# Patient Record
Sex: FEMALE | URBAN - METROPOLITAN AREA
[De-identification: names, ages, dates, MRNs, and addresses within clinical notes are randomized per-mention and may not be internally consistent; named-entity substitution may affect disease eponyms.]

---

## 2023-03-02 ENCOUNTER — APPOINTMENT (RX ONLY)
Dept: URBAN - METROPOLITAN AREA CLINIC 45 | Facility: CLINIC | Age: 33
Setting detail: DERMATOLOGY
End: 2023-03-02

## 2023-03-02 DIAGNOSIS — Z41.9 ENCOUNTER FOR PROCEDURE FOR PURPOSES OTHER THAN REMEDYING HEALTH STATE, UNSPECIFIED: ICD-10-CM

## 2023-03-02 PROCEDURE — ? FULL BODY SKIN EXAM - DECLINED

## 2023-03-02 PROCEDURE — ? COSMETIC CONSULTATION: LHR

## 2023-03-02 PROCEDURE — ? ADDITIONAL NOTES

## 2023-03-02 NOTE — PROCEDURE: COSMETIC CONSULTATION: [facility]
Price (Use Numbers Only, No Special Characters Or $): 449 Price (Use Numbers Only, No Special Characters Or $): 960

## 2023-03-02 NOTE — HPI: COSMETIC CONSULTATION
Additional History: New patient \\nPatient present for laser hair removal, patient started laser at a different clinic but clinic closed

## 2023-03-14 ENCOUNTER — APPOINTMENT (RX ONLY)
Dept: URBAN - METROPOLITAN AREA CLINIC 45 | Facility: CLINIC | Age: 33
Setting detail: DERMATOLOGY
End: 2023-03-14

## 2023-03-14 DIAGNOSIS — Z41.9 ENCOUNTER FOR PROCEDURE FOR PURPOSES OTHER THAN REMEDYING HEALTH STATE, UNSPECIFIED: ICD-10-CM

## 2023-03-14 PROCEDURE — ? LASER HAIR REMOVAL

## 2023-03-14 ASSESSMENT — LOCATION ZONE DERM: LOCATION ZONE: TRUNK

## 2023-03-14 ASSESSMENT — LOCATION SIMPLE DESCRIPTION DERM: LOCATION SIMPLE: GROIN

## 2023-03-14 ASSESSMENT — LOCATION DETAILED DESCRIPTION DERM: LOCATION DETAILED: RIGHT SUPRAPUBIC SKIN

## 2023-03-14 NOTE — PROCEDURE: LASER HAIR REMOVAL
Detail Level: Detailed
Device Serial Number (Optional): Rental Unit
Laser Type: Nd:Yag [id number]
Consent: Written consent obtained, risks reviewed including but not limited to crusting, scabbing, blistering, scarring, darker or lighter pigmentary change, paradoxical hair regrowth, incomplete removal of hair and infection.
Pulse Duration (Include Units): 15MS
Number Of Prepaid Treatments (Will Not Render If 0): 0
Spot Size: 15 mm
Total Pulses: 331
Cooling: DCD setting
Eye Shield Text: Given the treatment area protective safety goggles were worn prior to starting treatment and during entire treatment.
External Cooling Fan Speed: 5
Post-Procedure Care: Immediate endpoint: perifollicular erythema and edema. Sunscreen applied. Post care reviewed with patient.
Price (Use Numbers Only, No Special Characters Or $): 499.00
Fluence (Will Not Render If 0): 40
Pulse Duration (Include Units): 40ms
Treatment Number: 1
Render Post-Care In The Note: No
Pre-Procedure: Prior to proceeding with the treatment, areas were cleaned and alcohol applied. Safety goggles were put over their eyes for protection.
Fluence (Will Not Render If 0): 20
Shaving (Optional): The patient shaved at home
External Cooling: SmartCool
Fluence (Will Not Render If 0): 25
Comments: Cynosure Elite\\n
Were Eye Shields Employed?: Yes
Total Pulses: 80
Post-Care Instructions: I reviewed with the patient in detail post-care instructions. Patient should avoid sun for a minimum of 4 weeks before and after treatment.
Tolerated Procedure (Optional): Tolerated Well